# Patient Record
Sex: MALE | Race: WHITE | NOT HISPANIC OR LATINO | Employment: OTHER | URBAN - METROPOLITAN AREA
[De-identification: names, ages, dates, MRNs, and addresses within clinical notes are randomized per-mention and may not be internally consistent; named-entity substitution may affect disease eponyms.]

---

## 2022-11-23 ENCOUNTER — OFFICE VISIT (OUTPATIENT)
Dept: FAMILY MEDICINE | Facility: CLINIC | Age: 22
End: 2022-11-23
Payer: MEDICAID

## 2022-11-23 VITALS
HEIGHT: 69 IN | TEMPERATURE: 98 F | HEART RATE: 76 BPM | SYSTOLIC BLOOD PRESSURE: 118 MMHG | WEIGHT: 146.63 LBS | OXYGEN SATURATION: 97 % | BODY MASS INDEX: 21.72 KG/M2 | DIASTOLIC BLOOD PRESSURE: 78 MMHG | RESPIRATION RATE: 18 BRPM

## 2022-11-23 DIAGNOSIS — Q21.10 ASD (ATRIAL SEPTAL DEFECT): Primary | ICD-10-CM

## 2022-11-23 DIAGNOSIS — Q66.81 CONGENITAL VERTICAL TALUS DEFORMITY OF RIGHT FOOT: ICD-10-CM

## 2022-11-23 DIAGNOSIS — D17.21 LIPOMA OF RIGHT UPPER EXTREMITY: ICD-10-CM

## 2022-11-23 DIAGNOSIS — K44.9 DIAPHRAGMATIC HERNIA WITHOUT OBSTRUCTION AND WITHOUT GANGRENE: ICD-10-CM

## 2022-11-23 PROCEDURE — 3074F SYST BP LT 130 MM HG: CPT | Mod: CPTII,S$GLB,, | Performed by: FAMILY MEDICINE

## 2022-11-23 PROCEDURE — 99204 PR OFFICE/OUTPT VISIT, NEW, LEVL IV, 45-59 MIN: ICD-10-PCS | Mod: S$GLB,,, | Performed by: FAMILY MEDICINE

## 2022-11-23 PROCEDURE — 1159F MED LIST DOCD IN RCRD: CPT | Mod: CPTII,S$GLB,, | Performed by: FAMILY MEDICINE

## 2022-11-23 PROCEDURE — 99204 OFFICE O/P NEW MOD 45 MIN: CPT | Mod: S$GLB,,, | Performed by: FAMILY MEDICINE

## 2022-11-23 PROCEDURE — 3074F PR MOST RECENT SYSTOLIC BLOOD PRESSURE < 130 MM HG: ICD-10-PCS | Mod: CPTII,S$GLB,, | Performed by: FAMILY MEDICINE

## 2022-11-23 PROCEDURE — 3008F PR BODY MASS INDEX (BMI) DOCUMENTED: ICD-10-PCS | Mod: CPTII,S$GLB,, | Performed by: FAMILY MEDICINE

## 2022-11-23 PROCEDURE — 3078F DIAST BP <80 MM HG: CPT | Mod: CPTII,S$GLB,, | Performed by: FAMILY MEDICINE

## 2022-11-23 PROCEDURE — 1159F PR MEDICATION LIST DOCUMENTED IN MEDICAL RECORD: ICD-10-PCS | Mod: CPTII,S$GLB,, | Performed by: FAMILY MEDICINE

## 2022-11-23 PROCEDURE — 3008F BODY MASS INDEX DOCD: CPT | Mod: CPTII,S$GLB,, | Performed by: FAMILY MEDICINE

## 2022-11-23 PROCEDURE — 3078F PR MOST RECENT DIASTOLIC BLOOD PRESSURE < 80 MM HG: ICD-10-PCS | Mod: CPTII,S$GLB,, | Performed by: FAMILY MEDICINE

## 2022-11-23 NOTE — PROGRESS NOTES
"  Family Medicine Note  [facility]    Chief Complaint   Patient presents with    Establish Care        HPI:  22 male here to establish care.  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    Acutely today, reporting some vague symptoms of diarrhea and palpitations.    Has lipoma to arm    ROS: as above    Vitals:    11/23/22 1404   BP: 118/78   BP Location: Left arm   Patient Position: Sitting   BP Method: Medium (Manual)   Pulse: 76   Resp: 18   Temp: 98 °F (36.7 °C)   TempSrc: Temporal   SpO2: 97%   Weight: 66.5 kg (146 lb 9.6 oz)   Height: 5' 9" (1.753 m)      Body mass index is 21.65 kg/m².      General:  AOx3, well nourished and developed in no acute distress  Eyes:  PERRLA, EOMI, vision intact grossly  ENT:  normal hearing, moist oral mucosa  Neck:  trachea midline with no masses or thyromegaly  Heart:  RRR, no murmurs.  No edema noted, extremities warm and well perfused  Lungs:  clear to auscultation bilaterally with symmetric chest movement  Abdomen:  Soft, nontender, nondistended.  Normal bowel sounds  Musculoskeletal:  Normal gait.  Normal posture.  Normal muscular development with no joint swelling.  Small lipoma to R forearm  Neurological:  CN II-XII grossly intact. Symmetric strength and sensation  Psych:  Normal mood and affect.  Able to demonstrate good judgement and personal insight.      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

## 2022-11-29 ENCOUNTER — TELEPHONE (OUTPATIENT)
Dept: FAMILY MEDICINE | Facility: CLINIC | Age: 22
End: 2022-11-29
Payer: MEDICAID

## 2022-11-29 NOTE — TELEPHONE ENCOUNTER
Call placed to patient due lab results and Dr. Velasquez's orders, information given to his mother (Laverne Sauer) states will relay information to patient.    ----- Message from Yamil Velasquez MD sent at 11/29/2022  8:35 AM CST -----  Please contact the patient and let them know that their results were fine and do not require any change in treatment.

## 2022-12-08 ENCOUNTER — TELEPHONE (OUTPATIENT)
Dept: SURGERY | Facility: CLINIC | Age: 22
End: 2022-12-08
Payer: MEDICAID

## 2022-12-08 NOTE — TELEPHONE ENCOUNTER
Attempted to contact Chay Sauer to discuss  scheduling a consult with Dr. Silveira/ Elijah from referral from MD Ron in regards to lipoma on right arm .    Left voice mail to return our call at [phone] on [phone] (home).    Dwight Maier MA

## 2022-12-16 ENCOUNTER — OFFICE VISIT (OUTPATIENT)
Dept: FAMILY MEDICINE | Facility: CLINIC | Age: 22
End: 2022-12-16
Payer: MEDICAID

## 2022-12-16 VITALS
HEART RATE: 88 BPM | OXYGEN SATURATION: 97 % | WEIGHT: 147.81 LBS | SYSTOLIC BLOOD PRESSURE: 122 MMHG | BODY MASS INDEX: 21.89 KG/M2 | DIASTOLIC BLOOD PRESSURE: 70 MMHG | HEIGHT: 69 IN | TEMPERATURE: 98 F

## 2022-12-16 DIAGNOSIS — R10.84 GENERALIZED ABDOMINAL PAIN: ICD-10-CM

## 2022-12-16 DIAGNOSIS — R62.50 DEVELOPMENTAL DELAY: ICD-10-CM

## 2022-12-16 DIAGNOSIS — K44.9 DIAPHRAGMATIC HERNIA WITHOUT OBSTRUCTION AND WITHOUT GANGRENE: Primary | ICD-10-CM

## 2022-12-16 PROCEDURE — 99214 PR OFFICE/OUTPT VISIT, EST, LEVL IV, 30-39 MIN: ICD-10-PCS | Mod: S$GLB,,, | Performed by: FAMILY MEDICINE

## 2022-12-16 PROCEDURE — 3078F PR MOST RECENT DIASTOLIC BLOOD PRESSURE < 80 MM HG: ICD-10-PCS | Mod: CPTII,S$GLB,, | Performed by: FAMILY MEDICINE

## 2022-12-16 PROCEDURE — 1159F PR MEDICATION LIST DOCUMENTED IN MEDICAL RECORD: ICD-10-PCS | Mod: CPTII,S$GLB,, | Performed by: FAMILY MEDICINE

## 2022-12-16 PROCEDURE — 99214 OFFICE O/P EST MOD 30 MIN: CPT | Mod: S$GLB,,, | Performed by: FAMILY MEDICINE

## 2022-12-16 PROCEDURE — 3008F BODY MASS INDEX DOCD: CPT | Mod: CPTII,S$GLB,, | Performed by: FAMILY MEDICINE

## 2022-12-16 PROCEDURE — 3078F DIAST BP <80 MM HG: CPT | Mod: CPTII,S$GLB,, | Performed by: FAMILY MEDICINE

## 2022-12-16 PROCEDURE — 3008F PR BODY MASS INDEX (BMI) DOCUMENTED: ICD-10-PCS | Mod: CPTII,S$GLB,, | Performed by: FAMILY MEDICINE

## 2022-12-16 PROCEDURE — 1159F MED LIST DOCD IN RCRD: CPT | Mod: CPTII,S$GLB,, | Performed by: FAMILY MEDICINE

## 2022-12-16 PROCEDURE — 3074F SYST BP LT 130 MM HG: CPT | Mod: CPTII,S$GLB,, | Performed by: FAMILY MEDICINE

## 2022-12-16 PROCEDURE — 3074F PR MOST RECENT SYSTOLIC BLOOD PRESSURE < 130 MM HG: ICD-10-PCS | Mod: CPTII,S$GLB,, | Performed by: FAMILY MEDICINE

## 2022-12-16 RX ORDER — SUCRALFATE 1 G/10ML
1 SUSPENSION ORAL 4 TIMES DAILY PRN
Qty: 414 ML | Refills: 3 | Status: SHIPPED | OUTPATIENT
Start: 2022-12-16

## 2022-12-16 NOTE — PROGRESS NOTES
"  Family Medicine Note  [facility]    Chief Complaint   Patient presents with    Abdominal Pain     2 month    Follow-up        HPI:  22 male.  Autistic in setting of congenital heart issues.  Reporting concern about stomach today.    Stomach issues - does have relatively severe GERD.  Has post-prandial stomach pain, diarrhea, bloating.  Tends to be worse with fatty foods, but can happen with any type of food intake    Diet is somewhat poor    ROS: as above    Vitals:    12/16/22 1110   BP: 122/70   BP Location: Left arm   Patient Position: Sitting   BP Method: Medium (Manual)   Pulse: 88   Temp: 98 °F (36.7 °C)   TempSrc: Temporal   SpO2: 97%   Weight: 67 kg (147 lb 12.8 oz)   Height: 5' 9" (1.753 m)      Body mass index is 21.83 kg/m².      General:  AOx3, well nourished and developed in no acute distress  Eyes:  PERRLA, EOMI, vision intact grossly  ENT:  normal hearing, moist oral mucosa  Neck:  trachea midline with no masses or thyromegaly  Heart:  RRR, no murmurs.  No edema noted, extremities warm and well perfused  Lungs:  clear to auscultation bilaterally with symmetric chest movement  Abdomen:  Soft, nontender, nondistended.  Normal bowel sounds  Musculoskeletal:  Normal gait.  Normal posture.  Normal muscular development with no joint swelling.  Neurological:  CN II-XII grossly intact. Symmetric strength and sensation  Psych:  Normal mood and affect.  Able to demonstrate good judgement and personal insight.      Assessment/Plan:    Diaphragmatic hernia without obstruction and without gangrene    Developmental delay    Generalized abdominal pain        Concern today for issues with gallbladder.  Get ultrasound to evaluate stones.  Use carafate as needed.  If workup negative, needs GI follow up for scope.  Also needs dietary improvement      "

## 2022-12-16 NOTE — PATIENT INSTRUCTIONS
Use carafate as needed  Get ultrasound at [facility] to check out gallbladder    If workup negative, will send to GI    Eat foods with no ingredients:  vegetables, fruits, nuts, beans, eggs, whole grains, lean proteins

## 2023-01-30 ENCOUNTER — TELEPHONE (OUTPATIENT)
Dept: FAMILY MEDICINE | Facility: CLINIC | Age: 23
End: 2023-01-30
Payer: MEDICAID

## 2023-01-30 NOTE — TELEPHONE ENCOUNTER
----- Message from Lary Watts sent at 1/27/2023  1:50 PM CST -----  Contact: KAITLIN, MOTHER  Type:  Needs Medical Advice    Who Called: PT   Symptoms (please be specific): ULTRA SOUND W/ CONTRACT TO LOOK AT HIS GALL BLADDER   Would the patient rather a call back or a response via [facility]? CALL   Best Call Back Number: [phone]  Additional Information: PLEASE CALL TO DISCUSS. PT STILL NOT FEELING WELL. THANK YOU

## 2023-04-06 ENCOUNTER — TELEPHONE (OUTPATIENT)
Dept: LAB | Facility: CLINIC | Age: 23
End: 2023-04-06
Payer: MEDICAID

## 2023-04-06 NOTE — TELEPHONE ENCOUNTER
----- Message from Austin Moore sent at 4/6/2023 11:29 AM CDT -----  Contact: Self  Type:  Same Day Appointment Request    Caller is requesting a same day appointment.  Caller declined first available appointment listed below.      Name of Caller:  Mother/Laverne  When is the first available appointment?  5/1  Symptoms:  stomach issues, diarrhea  Best Call Back Number:  [phone]   Additional Information:

## 2023-04-12 ENCOUNTER — OFFICE VISIT (OUTPATIENT)
Dept: FAMILY MEDICINE | Facility: CLINIC | Age: 23
End: 2023-04-12
Payer: MEDICAID

## 2023-04-12 ENCOUNTER — TELEPHONE (OUTPATIENT)
Dept: FAMILY MEDICINE | Facility: CLINIC | Age: 23
End: 2023-04-12

## 2023-04-12 VITALS
TEMPERATURE: 97 F | HEIGHT: 69 IN | BODY MASS INDEX: 20.24 KG/M2 | WEIGHT: 136.63 LBS | SYSTOLIC BLOOD PRESSURE: 122 MMHG | DIASTOLIC BLOOD PRESSURE: 80 MMHG | OXYGEN SATURATION: 98 % | HEART RATE: 83 BPM

## 2023-04-12 DIAGNOSIS — Z90.49 S/P APPENDECTOMY: ICD-10-CM

## 2023-04-12 DIAGNOSIS — R62.50 DEVELOPMENTAL DELAY: Primary | ICD-10-CM

## 2023-04-12 DIAGNOSIS — R10.84 GENERALIZED ABDOMINAL PAIN: ICD-10-CM

## 2023-04-12 DIAGNOSIS — K44.9 DIAPHRAGMATIC HERNIA WITHOUT OBSTRUCTION AND WITHOUT GANGRENE: ICD-10-CM

## 2023-04-12 PROCEDURE — 1159F PR MEDICATION LIST DOCUMENTED IN MEDICAL RECORD: ICD-10-PCS | Mod: CPTII,S$GLB,, | Performed by: FAMILY MEDICINE

## 2023-04-12 PROCEDURE — 99214 OFFICE O/P EST MOD 30 MIN: CPT | Mod: S$GLB,,, | Performed by: FAMILY MEDICINE

## 2023-04-12 PROCEDURE — 3008F BODY MASS INDEX DOCD: CPT | Mod: CPTII,S$GLB,, | Performed by: FAMILY MEDICINE

## 2023-04-12 PROCEDURE — 3079F DIAST BP 80-89 MM HG: CPT | Mod: CPTII,S$GLB,, | Performed by: FAMILY MEDICINE

## 2023-04-12 PROCEDURE — 3079F PR MOST RECENT DIASTOLIC BLOOD PRESSURE 80-89 MM HG: ICD-10-PCS | Mod: CPTII,S$GLB,, | Performed by: FAMILY MEDICINE

## 2023-04-12 PROCEDURE — 3008F PR BODY MASS INDEX (BMI) DOCUMENTED: ICD-10-PCS | Mod: CPTII,S$GLB,, | Performed by: FAMILY MEDICINE

## 2023-04-12 PROCEDURE — 1159F MED LIST DOCD IN RCRD: CPT | Mod: CPTII,S$GLB,, | Performed by: FAMILY MEDICINE

## 2023-04-12 PROCEDURE — 99214 PR OFFICE/OUTPT VISIT, EST, LEVL IV, 30-39 MIN: ICD-10-PCS | Mod: S$GLB,,, | Performed by: FAMILY MEDICINE

## 2023-04-12 PROCEDURE — 3074F PR MOST RECENT SYSTOLIC BLOOD PRESSURE < 130 MM HG: ICD-10-PCS | Mod: CPTII,S$GLB,, | Performed by: FAMILY MEDICINE

## 2023-04-12 PROCEDURE — 3074F SYST BP LT 130 MM HG: CPT | Mod: CPTII,S$GLB,, | Performed by: FAMILY MEDICINE

## 2023-04-12 RX ORDER — DICYCLOMINE HYDROCHLORIDE 10 MG/1
10 CAPSULE ORAL
Qty: 90 CAPSULE | Refills: 0 | Status: SHIPPED | OUTPATIENT
Start: 2023-04-12 | End: 2023-05-12

## 2023-04-12 NOTE — LETTER
April 12, 2023          No Recipients             [address] [place] [address]  Phone: [phone]  Fax: [phone]   Patient: Chay Sauer   MR Number: [id number]   YOB: 2000   Date of Visit: 4/12/2023     To Whom It May Concern    Given Chay's chronic conditions of autism spectrum disorder and chronic arthritis, he may be a good candidate for medicinal marijuana.    Sincerely,      Yamil Velasquez MD            CC    No Recipients    Enclosure

## 2023-04-12 NOTE — PROGRESS NOTES
"    [facility] [place] [place]    Family Medicine Office Visit    Chief Complaint   Patient presents with    Follow-up     Patient presents due to continual complaint of abdominal issues following appendectomy 2 mths ago. Mainly diarrhea and GI upset. Complaint of increase heart rate, this am 148.        HPI:  23 male with developmental delay in setting of congenital heart issues.  Presents today with continued GI issues after appendectomy.    Granted, this is in setting of diaphragmatic hernia with some history of abdominal pain.    Had appendix removed robotically.  Chay is reporting transient episodes of tachycardia    ROS: as above    Vitals:    04/12/23 1255   BP: 122/80   BP Location: Left arm   Patient Position: Sitting   BP Method: Medium (Manual)   Pulse: 83   Temp: 97.3 °F (36.3 °C)   TempSrc: Temporal   SpO2: 98%   Weight: 62 kg (136 lb 9.6 oz)   Height: 5' 9" (1.753 m)      Body mass index is 20.17 kg/m².      General:  AOx3, well nourished and developed in no acute distress  Eyes:  PERRLA, EOMI, vision intact grossly  ENT:  normal hearing, moist oral mucosa  Neck:  trachea midline with no masses or thyromegaly  Heart:  RRR, no murmurs.  No edema noted, extremities warm and well perfused  Lungs:  clear to auscultation bilaterally with symmetric chest movement  Abdomen:  Soft, nontender, nondistended.  Normal bowel sounds  Musculoskeletal:  Normal gait.  Normal posture.  Normal muscular development with no joint swelling.  Neurological:  CN II-XII grossly intact. Symmetric strength and sensation  Psych:  Normal mood and affect.  Able to demonstrate good judgement and personal insight.      Assessment/Plan:    1. Developmental delay    2. Diaphragmatic hernia without obstruction and without gangrene    3. Generalized abdominal pain    4. S/P appendectomy        At this point, majority of symptoms appear to be mix of post-op recovery and baseline developmental delay.  Use " bentyl as needed.  Patient has interest in medicinal marijuana, I don't see many problems with this

## 2023-04-12 NOTE — TELEPHONE ENCOUNTER
----- Message from Robert Tejada sent at 4/12/2023 12:10 PM CDT -----  Regarding: Late  Contact: Patient  Type:  Needs Medical Advice    Who Called: Patient  Symptoms (please be specific): may be late for appointment   How long has patient had these symptoms:  more than 15 minutes  Pharmacy name and phone #:    Would the patient rather a call back or a response via [facility]? call  Best Call Back Number:[phone]  Additional Information: Please call to advise.

## 2023-04-12 NOTE — PATIENT INSTRUCTIONS
Use medication as needed for stomach cramping post-operatively  Will give letter for medicinal marijuana    Let me know if you continue to have issues with abnormal heart rate.

## 2023-05-16 ENCOUNTER — TELEPHONE (OUTPATIENT)
Dept: PRIMARY CARE CLINIC | Facility: CLINIC | Age: 23
End: 2023-05-16
Payer: MEDICAID

## 2023-05-16 NOTE — TELEPHONE ENCOUNTER
----- Message from Austin Moore sent at 5/16/2023 10:25 AM CDT -----  Contact: Mother/Laverne  Type:  Sooner Appointment Request    Caller is requesting a sooner appointment.  Caller declined first available appointment listed below.  Caller will not accept being placed on the waitlist and is requesting a message be sent to doctor.    Name of Caller:  Mother/Laverne  When is the first available appointment?  6/22  Symptoms:  Follow up, symptoms not improving  Best Call Back Number:  [phone]   Additional Information:    Would like to be seen end of next week

## 2023-05-26 ENCOUNTER — OFFICE VISIT (OUTPATIENT)
Dept: FAMILY MEDICINE | Facility: CLINIC | Age: 23
End: 2023-05-26
Payer: MEDICAID

## 2023-05-26 VITALS
HEIGHT: 69 IN | WEIGHT: 136.13 LBS | HEART RATE: 64 BPM | SYSTOLIC BLOOD PRESSURE: 110 MMHG | DIASTOLIC BLOOD PRESSURE: 70 MMHG | OXYGEN SATURATION: 99 % | TEMPERATURE: 98 F | BODY MASS INDEX: 20.16 KG/M2

## 2023-05-26 DIAGNOSIS — K59.1 FUNCTIONAL DIARRHEA: ICD-10-CM

## 2023-05-26 DIAGNOSIS — F84.0 AUTISM: ICD-10-CM

## 2023-05-26 DIAGNOSIS — R06.02 SHORTNESS OF BREATH: ICD-10-CM

## 2023-05-26 DIAGNOSIS — R62.50 DEVELOPMENTAL DELAY: ICD-10-CM

## 2023-05-26 DIAGNOSIS — Z90.49 S/P APPENDECTOMY: Primary | ICD-10-CM

## 2023-05-26 DIAGNOSIS — Q21.10 ASD (ATRIAL SEPTAL DEFECT): ICD-10-CM

## 2023-05-26 PROCEDURE — 99214 PR OFFICE/OUTPT VISIT, EST, LEVL IV, 30-39 MIN: ICD-10-PCS | Mod: S$GLB,,, | Performed by: FAMILY MEDICINE

## 2023-05-26 PROCEDURE — 3074F PR MOST RECENT SYSTOLIC BLOOD PRESSURE < 130 MM HG: ICD-10-PCS | Mod: CPTII,S$GLB,, | Performed by: FAMILY MEDICINE

## 2023-05-26 PROCEDURE — 99214 OFFICE O/P EST MOD 30 MIN: CPT | Mod: S$GLB,,, | Performed by: FAMILY MEDICINE

## 2023-05-26 PROCEDURE — 3008F PR BODY MASS INDEX (BMI) DOCUMENTED: ICD-10-PCS | Mod: CPTII,S$GLB,, | Performed by: FAMILY MEDICINE

## 2023-05-26 PROCEDURE — 1159F MED LIST DOCD IN RCRD: CPT | Mod: CPTII,S$GLB,, | Performed by: FAMILY MEDICINE

## 2023-05-26 PROCEDURE — 3078F DIAST BP <80 MM HG: CPT | Mod: CPTII,S$GLB,, | Performed by: FAMILY MEDICINE

## 2023-05-26 PROCEDURE — 3074F SYST BP LT 130 MM HG: CPT | Mod: CPTII,S$GLB,, | Performed by: FAMILY MEDICINE

## 2023-05-26 PROCEDURE — 3078F PR MOST RECENT DIASTOLIC BLOOD PRESSURE < 80 MM HG: ICD-10-PCS | Mod: CPTII,S$GLB,, | Performed by: FAMILY MEDICINE

## 2023-05-26 PROCEDURE — 1159F PR MEDICATION LIST DOCUMENTED IN MEDICAL RECORD: ICD-10-PCS | Mod: CPTII,S$GLB,, | Performed by: FAMILY MEDICINE

## 2023-05-26 PROCEDURE — 3008F BODY MASS INDEX DOCD: CPT | Mod: CPTII,S$GLB,, | Performed by: FAMILY MEDICINE

## 2023-05-26 NOTE — PROGRESS NOTES
"    [facility] [place] [place]    Family Medicine Office Visit    Chief Complaint   Patient presents with    Follow-up     Patient have Diarrhea, Shortness of breath, and when he wake he's heart beat so fast.    Diarrhea        HPI:  23 male here for follow up.  Has known developmental delay.    Started bentyl last visit to help with digestive issues s/p removal of appendix    Reporting some cough/SOB as well    Has know ASD - reporting pretty notable SOB with minimal exertion, and sporadic tachycardia    Still having occasional diarrhea as well    ROS: as above    Vitals:    05/26/23 1132   BP: 110/70   BP Location: Right arm   Patient Position: Sitting   BP Method: Medium (Manual)   Pulse: 64   Temp: 97.8 °F (36.6 °C)   SpO2: 99%   Weight: 61.7 kg (136 lb 1.6 oz)   Height: 5' 9" (1.753 m)      Body mass index is 20.1 kg/m².      General:  AOx3, well nourished and developed in no acute distress  Eyes:  PERRLA, EOMI, vision intact grossly  ENT:  normal hearing, moist oral mucosa  Neck:  trachea midline with no masses or thyromegaly  Heart:  RRR, no murmurs.  No edema noted, extremities warm and well perfused  Lungs:  clear to auscultation bilaterally with symmetric chest movement  Abdomen:  Soft, nontender, nondistended.  Normal bowel sounds  Musculoskeletal:  Normal gait.  Normal posture.  Normal muscular development with no joint swelling.  Neurological:  CN II-XII grossly intact. Symmetric strength and sensation  Psych:  Normal mood and affect.  Able to demonstrate good judgement and personal insight.      Assessment/Plan:    1. S/P appendectomy    2. Developmental delay    3. ASD (atrial septal defect)    4. Shortness of breath    5. Functional diarrhea        Stable, but still having GI issues.  Stable  Get Echo as now presenting with SOB  May need GI follow up, but will focus on changes in food choices to regulate bowels      "

## 2023-05-26 NOTE — LETTER
May 26, 2023          No Recipients             [address] [place] [address]  Phone: [phone]  Fax: [phone]   Patient: Chay Sauer   MR Number: [id number]   YOB: 2000   Date of Visit: 5/26/2023     To Whom It May Concern:    Chay is under my care, and has functional autism.  He is a good candidate for medicinal marijuana therapy.      Sincerely,      Yamil Velasquez MD            CC    No Recipients    Enclosure

## 2023-05-26 NOTE — PATIENT INSTRUCTIONS
Focus primarily on diet today - eat diet that is simple and unprocessed.  Make diet bland and repetitive - oatmeal, lean proteins, fruits, vegetables, nuts, beans, eggs, whole grains    Consider overnight oats    Avoid processed juices, sugar drinks    Get echocardiogram to assess overall hear function

## 2023-06-19 ENCOUNTER — TELEPHONE (OUTPATIENT)
Dept: PRIMARY CARE CLINIC | Facility: CLINIC | Age: 23
End: 2023-06-19

## 2023-06-19 NOTE — TELEPHONE ENCOUNTER
Anay Velasquez,  Please review message below.  Parent states that patient is still having GI problems from diarrhea to gassiness. Requesting a referral to GI for evaluation.  Thank you.  Signed:  Pablo Vargas LPN      ----- Message from Nathalia Cruz sent at 6/14/2023  4:26 PM CDT -----  Contact: Laverne [phone]  Type: Needs Medical Advice  Who Called:  Pts Mother Laverne Souza Call Back Number: [phone]    Additional Information: Pts mother is calling to ask if pts Echo orders have been sent to [facility]. She has not heard anything from them. She also stated that she has not heard anything back from gastro and a refferal being sent. Pls call back and advise

## 2023-06-21 ENCOUNTER — TELEPHONE (OUTPATIENT)
Dept: FAMILY MEDICINE | Facility: CLINIC | Age: 23
End: 2023-06-21
Payer: MEDICAID

## 2023-06-21 DIAGNOSIS — Q21.10 ASD (ATRIAL SEPTAL DEFECT): Primary | ICD-10-CM

## 2023-06-21 NOTE — TELEPHONE ENCOUNTER
----- Message from Kenna Lee sent at 6/21/2023  2:18 PM CDT -----  Contact: Cristiane  Type: Needs Medical Advice    Who Called: Laverne/ TYRA Mother  Best Call Back Number: [phone]  Additional  Information: Requesting to get order for TRANSTHORACIC ECHO COMPLETE, sent  ATTN: MICHAEL, FAX# [phone] with CPT code 23952 sent to Medicaid for Prior authorization  Please Advise- Thank you

## 2023-06-28 ENCOUNTER — TELEPHONE (OUTPATIENT)
Dept: FAMILY MEDICINE | Facility: CLINIC | Age: 23
End: 2023-06-28
Payer: MEDICAID

## 2023-06-28 NOTE — TELEPHONE ENCOUNTER
----- Message from Thalia Serrano sent at 6/27/2023  2:27 PM CDT -----  Contact: [facility]  Type:  Needs Medical Advice    Who Called: Ashley robles       Would the patient rather a call back or a response via [facility]? fax     Best Call Back Number: [phone]  ATTN Jaelyn    Additional Information: Requesting order for ECHO be sent to [facility]. Please advise

## 2023-08-23 ENCOUNTER — TELEPHONE (OUTPATIENT)
Dept: FAMILY MEDICINE | Facility: CLINIC | Age: 23
End: 2023-08-23
Payer: MEDICAID

## 2023-08-23 NOTE — TELEPHONE ENCOUNTER
Anay Velasquez,  Please review message below.  Needs written statement today.  Call placed to patient's mother due to message left, states requesting statement with diagnosis for Social Security concerning his Autism and disability and unable to work. Parent needs statement today.  Please review and advise.  Thank you.  Signed:  Pablo Vargas LPN    ----- Message from Mayela Fuentes sent at 8/23/2023  9:46 AM CDT -----  Contact: pt  Type: Needs Medical Advice         Who Called: pt mother   Best Call Back Number:  [phone]  Additional Information: Requesting a call back regarding  pt mother is needing a letter for pt SS benefits. Pt mother is stating it for disability and why pt is unable to work.  Pt mother is needing the letter today as she has appt for tomorrow with SS.  Please Advise- Thank you

## 2023-08-23 NOTE — LETTER
August 23, 2023        Chay Sauer             [place] [address] [facility] [place] [address]  Phone: [phone]  Fax: [phone]   Patient: Chay Sauer   MR Number: [id number]   YOB: 2000   Date of Visit: 8/23/2023     To Whom It May Concern:    Chay Sauer is currently under my care.  He is actively treated for autism and developmental delay.  Given these conditions, he is disabled and unable to pursue gainful employment.    Sincerely,      Yamil Velasquez M.D.            CC  No Recipients    Enclosure

## 2023-09-28 ENCOUNTER — TELEPHONE (OUTPATIENT)
Dept: FAMILY MEDICINE | Facility: CLINIC | Age: 23
End: 2023-09-28
Payer: MEDICAID

## 2023-09-28 NOTE — TELEPHONE ENCOUNTER
----- Message from Thalia Serrano sent at 9/28/2023 11:36 AM CDT -----  Contact: PAtient's mother  Type:  Needs Medical Advice    Who Called: Patient       Would the patient rather a call back or a response via [employer]? Call    Best Call Back Number: [phone] (home)     Additional Information: Please be aware that the disability will be requesting medical records including but not limited a letter that Dr Velasquez typed for the patient

## 2024-02-05 NOTE — TELEPHONE ENCOUNTER
Spoke with patient mom. States that the patient went to ED and CT scan resulted that the patient appendix was inflamed and had it removed. States that the patient is home and recovering well.    musculoskeletal

## 2024-07-16 ENCOUNTER — OFFICE VISIT (OUTPATIENT)
Dept: FAMILY MEDICINE | Facility: CLINIC | Age: 24
End: 2024-07-16
Payer: MEDICAID

## 2024-07-16 VITALS
HEIGHT: 69 IN | OXYGEN SATURATION: 97 % | HEART RATE: 83 BPM | SYSTOLIC BLOOD PRESSURE: 124 MMHG | WEIGHT: 163.31 LBS | DIASTOLIC BLOOD PRESSURE: 72 MMHG | BODY MASS INDEX: 24.19 KG/M2

## 2024-07-16 DIAGNOSIS — Q21.10 ASD (ATRIAL SEPTAL DEFECT): Primary | ICD-10-CM

## 2024-07-16 DIAGNOSIS — R62.50 DEVELOPMENTAL DELAY: ICD-10-CM

## 2024-07-16 DIAGNOSIS — Z00.00 ANNUAL PHYSICAL EXAM: ICD-10-CM

## 2024-07-16 DIAGNOSIS — F84.0 AUTISM: ICD-10-CM

## 2024-07-16 DIAGNOSIS — Q66.81 CONGENITAL VERTICAL TALUS DEFORMITY OF RIGHT FOOT: ICD-10-CM

## 2024-07-16 PROCEDURE — 1159F MED LIST DOCD IN RCRD: CPT | Mod: CPTII,S$GLB,, | Performed by: FAMILY MEDICINE

## 2024-07-16 PROCEDURE — 3074F SYST BP LT 130 MM HG: CPT | Mod: CPTII,S$GLB,, | Performed by: FAMILY MEDICINE

## 2024-07-16 PROCEDURE — 3078F DIAST BP <80 MM HG: CPT | Mod: CPTII,S$GLB,, | Performed by: FAMILY MEDICINE

## 2024-07-16 PROCEDURE — 3008F BODY MASS INDEX DOCD: CPT | Mod: CPTII,S$GLB,, | Performed by: FAMILY MEDICINE

## 2024-07-16 PROCEDURE — 99395 PREV VISIT EST AGE 18-39: CPT | Mod: S$GLB,,, | Performed by: FAMILY MEDICINE

## 2024-07-16 NOTE — PROGRESS NOTES
"    [facility] [place], [place]    Family Medicine Office Visit    Chief Complaint   Patient presents with    Annual Exam        HPI:  24 male with Autism and history of ASD.  Echo last year showed no structural issues.  Gets some functional SOB still    Has some R foot pain, related to congenital issues related to surgery in youth    ROS: as above    Vitals:    07/16/24 1438   BP: 124/72   Pulse: 83   SpO2: 97%   Weight: 74.1 kg (163 lb 4.8 oz)   Height: 5' 9" (1.753 m)      Body mass index is 24.12 kg/m².      General:  AOx3, well nourished and developed in no acute distress  Eyes:  PERRLA, EOMI, vision intact grossly  ENT:  normal hearing, moist oral mucosa  Neck:  trachea midline with no masses or thyromegaly  Heart:  RRR, no murmurs.  No edema noted, extremities warm and well perfused  Lungs:  clear to auscultation bilaterally with symmetric chest movement  Abdomen:  Soft, nontender, nondistended.  Normal bowel sounds  Musculoskeletal:  Normal gait.  Normal posture.  Normal muscular development with no joint swelling.  Pain to dorsum R foot  Neurological:  CN II-XII grossly intact. Symmetric strength and sensation  Psych:  Normal mood and affect.  Able to demonstrate good judgement and personal insight.      Assessment/Plan:    1. ASD (atrial septal defect)    2. Autism    3. Developmental delay    4. 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 Annual physical exam       Stable  Stable/high function  As above  Get x ray  Advised extensively on diet to improve health      "

## 2024-07-16 NOTE — PATIENT INSTRUCTIONS
Doing well  Get x ray of foot in [facility] or [place]    WORK ON DIET - avoid coke, processed foods in general    Small lipomas - Dr. Vasquez can take care of that    In general, try to eat foods that only have one ingredient  - fruits, vegetables, lean proteins, beans, nuts, eggs, whole grains

## 2024-07-24 ENCOUNTER — OFFICE VISIT (OUTPATIENT)
Dept: FAMILY MEDICINE | Facility: CLINIC | Age: 24
End: 2024-07-24
Payer: MEDICAID

## 2024-07-24 VITALS
SYSTOLIC BLOOD PRESSURE: 110 MMHG | WEIGHT: 154.88 LBS | DIASTOLIC BLOOD PRESSURE: 72 MMHG | HEIGHT: 69 IN | BODY MASS INDEX: 22.94 KG/M2

## 2024-07-24 DIAGNOSIS — R51.9 ACUTE NONINTRACTABLE HEADACHE, UNSPECIFIED HEADACHE TYPE: ICD-10-CM

## 2024-07-24 DIAGNOSIS — M54.2 NECK PAIN: ICD-10-CM

## 2024-07-24 DIAGNOSIS — B97.89 SORE THROAT (VIRAL): ICD-10-CM

## 2024-07-24 DIAGNOSIS — H92.01 EARACHE ON RIGHT: Primary | ICD-10-CM

## 2024-07-24 DIAGNOSIS — J02.8 SORE THROAT (VIRAL): ICD-10-CM

## 2024-07-24 DIAGNOSIS — I88.9 LYMPHADENITIS: ICD-10-CM

## 2024-07-24 PROCEDURE — 3078F DIAST BP <80 MM HG: CPT | Mod: CPTII,S$GLB,, | Performed by: INTERNAL MEDICINE

## 2024-07-24 PROCEDURE — 1160F RVW MEDS BY RX/DR IN RCRD: CPT | Mod: CPTII,S$GLB,, | Performed by: INTERNAL MEDICINE

## 2024-07-24 PROCEDURE — 99213 OFFICE O/P EST LOW 20 MIN: CPT | Mod: S$GLB,,, | Performed by: INTERNAL MEDICINE

## 2024-07-24 PROCEDURE — 1159F MED LIST DOCD IN RCRD: CPT | Mod: CPTII,S$GLB,, | Performed by: INTERNAL MEDICINE

## 2024-07-24 PROCEDURE — 3008F BODY MASS INDEX DOCD: CPT | Mod: CPTII,S$GLB,, | Performed by: INTERNAL MEDICINE

## 2024-07-24 PROCEDURE — 3074F SYST BP LT 130 MM HG: CPT | Mod: CPTII,S$GLB,, | Performed by: INTERNAL MEDICINE

## 2024-07-24 RX ORDER — AMOXICILLIN AND CLAVULANATE POTASSIUM 875; 125 MG/1; MG/1
1 TABLET, FILM COATED ORAL EVERY 12 HOURS
Qty: 6 TABLET | Refills: 0 | Status: SHIPPED | OUTPATIENT
Start: 2024-07-24 | End: 2024-07-27

## 2024-07-24 NOTE — ASSESSMENT & PLAN NOTE
Increase your intake of fluids and stay hydrated  Use over-the-counter cold and sinus medicine for example DayQuil for daytime ,NyQuil for nighttime  Please add Tylenol , Aleve or Advil as needed for low-grade temperatures and soreness  Get enough rest  No need for treatment with antibiotics at the current time as your symptoms and lack of temp point to a viral or allergic disease  Please call us back or return if fever develops or symptoms progress

## 2024-07-24 NOTE — PROGRESS NOTES
Subjective:       Patient ID: Chay Sauer is a 24 y.o. male.    Chief Complaint: Sore Throat (Followed with occ otalgia and discharge. He  c.o pain when swallowing.) and Headache (X 1 week )      Patient is established already .......... presents today for an URI like illness    Sore Throat   This is a recurrent problem. The current episode started in the past 7 days. The problem has been unchanged. The pain is worse on the left side. There has been no fever. Associated symptoms include coughing, headaches and swollen glands. Associated symptoms comments: earache. He has had no exposure to strep or mono. He has tried nothing for the symptoms.   Headache   Associated symptoms include coughing, a sore throat and swollen glands. Pertinent negatives include no fever.     Review of Systems   Constitutional:  Negative for activity change, appetite change and fever.   HENT:  Positive for sore throat.    Respiratory:  Positive for cough.    Cardiovascular: Negative.    Gastrointestinal: Negative.    Musculoskeletal: Negative.    Neurological:  Positive for headaches.         Objective:      Physical Exam  Vitals and nursing note reviewed.   Constitutional:       Appearance: Normal appearance.   HENT:      Head: Normocephalic and atraumatic.      Right Ear: Tympanic membrane normal.      Left Ear: Tympanic membrane normal.      Nose: Nose normal.      Mouth/Throat:      Mouth: Mucous membranes are dry.      Pharynx: No oropharyngeal exudate or posterior oropharyngeal erythema.   Cardiovascular:      Pulses: Normal pulses.      Heart sounds: Normal heart sounds.   Pulmonary:      Effort: Pulmonary effort is normal.      Breath sounds: Normal breath sounds.   Musculoskeletal:      Cervical back: Normal range of motion and neck supple. No rigidity or tenderness.   Lymphadenopathy:      Cervical: Cervical adenopathy present.   Neurological:      Mental Status: He is alert.         Assessment:       1. Earache on right    2. Neck  pain    3. Sore throat (viral)  Overview:  No f/c  + cough , congestion for few days  +  sore throat/ swollen glands  + L earache    Assessment & Plan:  Increase your intake of fluids and stay hydrated  Use over-the-counter cold and sinus medicine for example DayQuil for daytime ,NyQuil for nighttime  Please add Tylenol , Aleve or Advil as needed for low-grade temperatures and soreness  Get enough rest  No need for treatment with antibiotics at the current time as your symptoms and lack of temp point to a viral or allergic disease  Please call us back or return if fever develops or symptoms progress        4. Acute nonintractable headache, unspecified headache type    5. Lymphadenitis  Overview:  Vs sialadenitis of upper mid neck    Assessment & Plan:  Add augmentin  Use advil or tylenol      Other orders  -     amoxicillin-clavulanate 875-125mg (AUGMENTIN) 875-125 mg per tablet; Take 1 tablet by mouth every 12 (twelve) hours. for 3 days  Dispense: 6 tablet; Refill: 0           Plan:       1. Earache on right    2. Neck pain    3. Sore throat (viral)  Overview:  No f/c  + cough , congestion for few days  +  sore throat/ swollen glands  + L earache    Assessment & Plan:  Increase your intake of fluids and stay hydrated  Use over-the-counter cold and sinus medicine for example DayQuil for daytime ,NyQuil for nighttime  Please add Tylenol , Aleve or Advil as needed for low-grade temperatures and soreness  Get enough rest  No need for treatment with antibiotics at the current time as your symptoms and lack of temp point to a viral or allergic disease  Please call us back or return if fever develops or symptoms progress        4. Acute nonintractable headache, unspecified headache type    5. Lymphadenitis  Overview:  Vs sialadenitis of upper mid neck    Assessment & Plan:  Add augmentin  Use advil or tylenol      Other orders  -     amoxicillin-clavulanate 875-125mg (AUGMENTIN) 875-125 mg per tablet; Take 1 tablet by  mouth every 12 (twelve) hours. for 3 days  Dispense: 6 tablet; Refill: 0

## 2024-10-21 PROBLEM — Z00.00 ANNUAL PHYSICAL EXAM: Status: RESOLVED | Noted: 2024-07-16 | Resolved: 2024-10-21

## 2024-10-28 NOTE — TELEPHONE ENCOUNTER
----- Message from Kenna Lee sent at 6/27/2023  3:22 PM CDT -----  Contact: Cecy  Type: Needs Medical Advice    Who Called: Cecy/ [facility]  Best Call Back Number: [phone]  Additional  Information: Requesting to get order for Echo, faxed to FAX# [phone]. PT scheduled for Thursday 6/29/30  Please Advise- Thank you     What Is The Reason For Today's Visit?: Full Body Skin Examination What Is The Reason For Today's Visit? (Being Monitored For X): concerning skin lesions on a periodic basis Additional History: Patient presents for TBSE today

## 2025-08-20 ENCOUNTER — PATIENT MESSAGE (OUTPATIENT)
Dept: ADMINISTRATIVE | Facility: HOSPITAL | Age: 25
End: 2025-08-20